# Patient Record
Sex: MALE
[De-identification: names, ages, dates, MRNs, and addresses within clinical notes are randomized per-mention and may not be internally consistent; named-entity substitution may affect disease eponyms.]

---

## 2020-06-12 ENCOUNTER — HOSPITAL ENCOUNTER (OUTPATIENT)
Dept: HOSPITAL 90 - RAH | Age: 30
Discharge: HOME | End: 2020-06-12
Attending: FAMILY MEDICINE
Payer: MEDICARE

## 2020-06-12 DIAGNOSIS — R89.1: ICD-10-CM

## 2020-06-12 DIAGNOSIS — F84.0: Primary | ICD-10-CM

## 2020-06-12 DIAGNOSIS — G80.9: ICD-10-CM

## 2020-06-12 PROCEDURE — 99152 MOD SED SAME PHYS/QHP 5/>YRS: CPT

## 2020-06-12 PROCEDURE — 70553 MRI BRAIN STEM W/O & W/DYE: CPT

## 2020-06-12 PROCEDURE — 99153 MOD SED SAME PHYS/QHP EA: CPT

## 2020-06-12 NOTE — NUR
MRI PITUITARY GLAND W/WO WITH CONSCIOUS SEDATION

PATIENT TOLERATED PROCEDURE WELL.  HEPLOCK REMOVED AND PATIENT RECOVERED IN RADIOLOGY ROOM.  
DISCHARGE INSTRUCTIONS GIVEN TO CAREGIVER AND SHE VERBALIZED UNDERSTANDING.  DISCHARGED VIA 
W/C .  AAO X3 WITH NO C/O DISCOMFORT.